# Patient Record
Sex: MALE | URBAN - METROPOLITAN AREA
[De-identification: names, ages, dates, MRNs, and addresses within clinical notes are randomized per-mention and may not be internally consistent; named-entity substitution may affect disease eponyms.]

---

## 2022-09-13 ENCOUNTER — OUT OF OFFICE VISIT (OUTPATIENT)
Dept: URBAN - METROPOLITAN AREA MEDICAL CENTER 12 | Facility: MEDICAL CENTER | Age: 72
End: 2022-09-13
Payer: MEDICARE

## 2022-09-13 DIAGNOSIS — D64.89 ANEMIA DUE TO OTHER CAUSE: ICD-10-CM

## 2022-09-13 DIAGNOSIS — E87.1 HYPONATREMIA: ICD-10-CM

## 2022-09-13 DIAGNOSIS — Z79.02 ANTIPLATELET OR ANTITHROMBOTIC LONG-TERM USE: ICD-10-CM

## 2022-09-13 PROCEDURE — G8427 DOCREV CUR MEDS BY ELIG CLIN: HCPCS | Performed by: PHYSICIAN ASSISTANT

## 2022-09-13 PROCEDURE — 99222 1ST HOSP IP/OBS MODERATE 55: CPT | Performed by: PHYSICIAN ASSISTANT

## 2022-09-13 PROCEDURE — 99232 SBSQ HOSP IP/OBS MODERATE 35: CPT | Performed by: PHYSICIAN ASSISTANT

## 2022-09-19 ENCOUNTER — OUT OF OFFICE VISIT (OUTPATIENT)
Dept: URBAN - METROPOLITAN AREA MEDICAL CENTER 12 | Facility: MEDICAL CENTER | Age: 72
End: 2022-09-19
Payer: MEDICARE

## 2022-09-19 DIAGNOSIS — K22.89 DILATATION OF ESOPHAGUS: ICD-10-CM

## 2022-09-19 DIAGNOSIS — K26.9 CHILDHOOD DUODENAL ULCER: ICD-10-CM

## 2022-09-19 DIAGNOSIS — D50.0 ANEMIA: ICD-10-CM

## 2022-09-19 DIAGNOSIS — K22.2 ACQUIRED ESOPHAGEAL RING: ICD-10-CM

## 2022-09-19 PROCEDURE — 43239 EGD BIOPSY SINGLE/MULTIPLE: CPT | Performed by: INTERNAL MEDICINE

## 2022-09-19 PROCEDURE — 45378 DIAGNOSTIC COLONOSCOPY: CPT | Performed by: INTERNAL MEDICINE

## 2022-09-20 ENCOUNTER — OUT OF OFFICE VISIT (OUTPATIENT)
Dept: URBAN - METROPOLITAN AREA MEDICAL CENTER 12 | Facility: MEDICAL CENTER | Age: 72
End: 2022-09-20
Payer: MEDICARE

## 2022-09-20 DIAGNOSIS — E87.1 HYPONATREMIA: ICD-10-CM

## 2022-09-20 DIAGNOSIS — K26.9 CHILDHOOD DUODENAL ULCER: ICD-10-CM

## 2022-09-20 DIAGNOSIS — D64.89 ANEMIA DUE TO OTHER CAUSE: ICD-10-CM

## 2022-09-20 PROCEDURE — 99232 SBSQ HOSP IP/OBS MODERATE 35: CPT | Performed by: STUDENT IN AN ORGANIZED HEALTH CARE EDUCATION/TRAINING PROGRAM

## 2022-10-17 ENCOUNTER — OFFICE VISIT (OUTPATIENT)
Dept: URBAN - METROPOLITAN AREA CLINIC 92 | Facility: CLINIC | Age: 72
End: 2022-10-17
Payer: MEDICARE

## 2022-10-17 ENCOUNTER — DASHBOARD ENCOUNTERS (OUTPATIENT)
Age: 72
End: 2022-10-17

## 2022-10-17 VITALS
TEMPERATURE: 96.9 F | WEIGHT: 130 LBS | HEIGHT: 66 IN | HEART RATE: 78 BPM | BODY MASS INDEX: 20.89 KG/M2 | SYSTOLIC BLOOD PRESSURE: 129 MMHG | DIASTOLIC BLOOD PRESSURE: 81 MMHG

## 2022-10-17 DIAGNOSIS — K26.9 DUODENAL ULCER: ICD-10-CM

## 2022-10-17 DIAGNOSIS — D50.9 IRON DEFICIENCY ANEMIA, UNSPECIFIED IRON DEFICIENCY ANEMIA TYPE: ICD-10-CM

## 2022-10-17 PROBLEM — 51868009: Status: ACTIVE | Noted: 2022-10-17

## 2022-10-17 PROBLEM — 87522002: Status: ACTIVE | Noted: 2022-10-17

## 2022-10-17 PROCEDURE — 99204 OFFICE O/P NEW MOD 45 MIN: CPT

## 2022-10-17 RX ORDER — PANTOPRAZOLE SODIUM 40 MG/1
1 TABLET TABLET, DELAYED RELEASE ORAL TWICE A DAY
Qty: 180 TABLET | Refills: 0 | OUTPATIENT
Start: 2022-10-17

## 2022-10-17 RX ORDER — CLOPIDOGREL 75 MG/1
1 TABLET TABLET, FILM COATED ORAL ONCE A DAY
Status: ACTIVE | COMMUNITY

## 2022-10-17 RX ORDER — PANTOPRAZOLE SODIUM 40 MG/1
1 TABLET TABLET, DELAYED RELEASE ORAL TWICE A DAY
Status: ACTIVE | COMMUNITY

## 2022-10-17 RX ORDER — ATORVASTATIN CALCIUM 40 MG/1
1 TABLET TABLET, FILM COATED ORAL ONCE A DAY
Status: ACTIVE | COMMUNITY

## 2022-10-17 RX ORDER — FERROUS SULFATE 325(65) MG
1 TABLET TABLET ORAL ONCE A DAY
Qty: 90 | Refills: 1 | OUTPATIENT
Start: 2022-10-17

## 2022-10-17 NOTE — HPI-TODAY'S VISIT:
71-year-old male presents today for hospital visit follow-up.  He was seen at [facility] on 9- for profound anemia.  He was sent by outpatient physician due to weakness and fatigue for the past 4 days prior to presentation.  Patient's wife provided most of the hx and reported no melena or hematochezia, abdominal pain, n/v.  Patient has consistently done Cologuard screenings but has never had a colonoscopy or EGD in the past.  Patient's mother did have dx of colon cancer and patient's father had esophageal cancer.  Patient quit smoking over 20 years ago and has not had alcohol in over 10 years.  When he did drink he drinks 1-2 times per month always 1 glass of wine.  Patient has taken Motrin a few times this week for knee pain but does not consistently take any NSAIDs.  Labs at admission showed hemoglobin of 5.5.   Colonoscopy 9- internal hemorrhoids otherwise normal. EGD 9-: 2 cm HH, widely patent Schatzki ring at GE junction, 1 nonbleeding superficial duodenal ulcer that was 5 mm H. pylori was negative.  At discharge hemoglobin was 10.8.  He was discharged and told to continue PPI twice daily for 8 weeks and restart Plavix and aspirin.  Currently, is seen with his wife who provides most of his history due to aphasia. She states that patient's cardiologist has since d/c the aspirin but patient is still on Plavix. He is still on pantoprazole 40mg BID and knows to continue this for 8 weeks total. He denies adb pain, GERD sx, N/V, melena, hematochezia, NSAID use.  Has 1 BM daily.He has been taking iron QOD and has some constipation with the iron. He has been on Metamucil BID and this helps the constipation.  He is seeing heme Dr Geovanni Edgar for anemia and has been on iron infusions. Last infusion was last week and he has another infusion in 1 month. Wife notes patients rx for oral iron is done and would like to know if he needs to continue this. Last labs done last week [id number] hgb, 39.3 hct, no iron ferritin levels were available to view.